# Patient Record
(demographics unavailable — no encounter records)

---

## 2018-01-01 NOTE — RAD
HISTORY:

Tachypnea in 35 weeker NB.  



COMPARISON:

No prior.



TECHNIQUE:

Chest PA and lateral



FINDINGS:



LUNGS:

No active pulmonary disease.



PLEURA:

No significant pleural effusion identified. No pneumothorax apparent.



CARDIOVASCULAR:

Normal.



OSSEOUS STRUCTURES:

No significant abnormalities.



VISUALIZED UPPER ABDOMEN:

Normal.



OTHER FINDINGS:

None.



IMPRESSION:

No active disease.

## 2018-01-01 NOTE — DELATT
===========================

Datetime: 2018 11:33

===========================

   

Del Note Departure Status:   Nursery

Del Note Status:  Late  male NB (35 weeker) by NVD.

   Mother is GBS positive with ROM about 22 HRS or more (seen in office yesterday PTA and membranes w
ere ruptured).  Had 6 doses of Ampicillin PTD.

   Also, the mother had one dose of Betamethasone yesterday.

   Mother has GDM that was diet controlled.

   Baby has mild intermittent tachynpnea after brief PPV/ressuscitation.

   Baby transferred to nursery for observtion.

Del Note Interventions Oth:  Called by DR. Fuentes for delivery attendance.

      

   Baby born with decreased activity.

   Stimulated. after stimulation has breathing effort but HR stayed 80/min.  PPV initiated; Applied f
or about 40 seconds.  Had excellent respond.

      

   APGAR:

   minute 1= 7 (-1 heart, -1 tone, -1 color).

   Minute 5: 9 (-1 for color).

      

   After PPV O2 applied by mask and with guidance of oximeter.

      

   Baby developed mild intermittent tachypnea without retractions.

      

      

Del Note Interventions:  Assessment; Stimulation; Drying; Blow By Oxygen; Bag/Mask

Del Note Reason for Attending:  Prematurity

UMER/NICU Del Atten Note Adm DT:  2018 11:42

## 2018-01-01 NOTE — NBADN
===========================

Datetime: 2018 11:42

===========================

   

Nsy Prov Gen Appearance:  Within Normal Limits

Nsy Prov Gen Appearance:  Within Normal Limits

Nsy Prov Skin:  Within Normal Limits

Nsy Prov Neuro:  Normal Tone; [place]; Grasp; Root; Suck

Nsy Prov Musculoskeletal:  Within Normal Limits; Full Range of Motion; Spontaneous Movement All Extre
mities; Intact Clavicles; Clavicles without Crepitus; Gluteal Folds Symmetrical; Spine Within Normal 
Limits

Nsy Prov Head:  Normal Fontanelles; Normocephalic; Sutures WNL

Nsy Prov EENT:  Mouth Within Normal Limits; Ears Within Normal Limits; Eyes Within Normal Limits; Nos
e Within Normal Limits; Face Within Normal Limits

Nsy Prov Cardiovascular:  Murmur

Nsy Prov Respiratory:  Tachypneic

Nsy Prov GI:  Within Normal Limits; Soft; Normal Liver; Non Palpable Spleen; Patent Anus

Nsy Prov Umbilicus:  Within Normal Limits

Nsy Prov :  Normal Male Genitalia

Nsy Prov Cardiovascular Details:  2/6 systolic soft murmur best heard over LLSB.

Nsy Prov Gen Appearance Details:  except for mild intermittent tachypnea.

Nsy Prov Respiratory Details:  RR it its upper limites reached 60-70/min for brief periods.

Nsy Prov Impression/Plan Details:  Late  male NB (35 weeker) by NVD.

   Mother is GBS positive with ROM about 22 HRS or more (seen in office yesterday PTA when membranes 
were ruptured).  Had 6 doses of Ampicillin PTD.

   Also, the mother had one dose of Betamethasone yesterday.

   Mother has GDM that was diet controlled.

   Baby has mild intermittent tachynpnea after brief PPV/ressuscitation.

   Baby has also heart murmur.

   Baby transferred to nursery for observtion.

      

   Plan: Nursery.  Possible mother-baby unit care if stability assured.

Nsy Prov Laboratory:  CBC. BCX. Accucheck. CXR. Echo.

   

===========================

Datetime: 2018 11:33

===========================

   

Mother's Rule Inc Maternal Age:  Age >=35 at [place] not specified

Mother's Rule Thalassemia:  Thalassemia History not specified

Mother's Rule Neural Tube Defect:  Neural Tube Defect History not specified

Mother's Rule Congenital Heart:  Congenital Heart Defect not specified

Mother's Rule Down Syndrome:  Down Syndrome History not specified

Mother's Rule Ed-Sachs:  Ed-Sachs History not specified

Mother's Rule [place]:  [place] History not specified

Mother's Rule Familial Dysauto:  Familial Dysautonomia History not specified

Mother's Rule Sickle Cell:  Sickle Cell Disease/Trait History not specified

Mother's Rule Hemophilia:  Hemophilia/Blood Disorder History not specified

Mother's Rule Muscular Dystrophy:  Muscular Dystrophy History not specified

Mother's Rule Cystic Fibrosis:  Cystic Fibrosis History not specified

Mother's Rule [place] Chor:  [place] Ch[place] History not specified

Mother's Rule Mental Retardation:  Mental Retardation/Autism History not specified

Mother's Rule Fragile X:  Fragile X Testing History not specified

Mother's Rule Oth Inherited DO:  Other Inherited/Chromosomal Disorders not specified

Mother's Rule Maternal Metabolic:  Maternal Metabolic History not specified

Mother's Rule FOB Birth Defects:  Pt Father or FOB Birth Defect History not specified

Mother's Rule Hx Stillborn MBL:  Loss/Stillborn History not specified

Mother's Rule Other Genetic Hx:  Other Genetic History not specified

Mother's Rule Drugs/Medications:  Drugs/Medications History not specified

Mother's Rule Gonorrhea:   Gonorrhea History Not Specified

Mother's Rule Chlamydia:  Chlamydia History not specified

Mother's Rule Syphilis:  Syphilis History not specified

Mother's Rule HIV/AIDS Exp:  HIV/Aids Exposure not specified

Mother's Rule HPV:  Human Papillomavirus History not specified

Mother's Rule Genital Herpes:  Genital Herpes not specified

Mother's Rule TB:  Tuberculosis History not specified

Mother's Rule Hepatitis:  Hepatitis History Not Specified

Mother's Rule Rash or Viral Ill:  Rash or Viral Illness History not specified

Mother's Rule Diabetes:  Diabetes History not specified

Mother's Rule Hypertension MBL:  History of Hypertension Not Specified

Mother's Rule Heart Disease:  Heart Disease History not specified

Mother's Rule Autoimmune:  Autoimmune Disorder History not specified

Mother's Rule Kidney Disease:  History of Kidney Disease/UTI not specified

Mother's Rule Neurologic:  Neurologic/Epilepsy Disorders not specified

Mother's Rule Psych Disorders:  Psychiatric Disorder History not specified

Mother's Rule Depression/PP Dep:  Depression/Postpartum Depression History not specified

Mother's Rule Hepaitis/tLiver:  History of Hepatitis/Liver Disease not specified

Mother's Rule Varicos/Phlebitis:  Varicosities/Phlebitis History Not Specified

Mother's Rule Thyroid Dysfunct:  Thyroid Dysfunction not specified

Mother's Rule Trauma/Violence:  Trauma/Violence History Not Specified

Mother's Rule Blood Transfusion:  Blood Transfusion History not specified

Mother's Rule Sensitization:  D (Rh) Sensitization not specified

Mother's Rule Pulmonary:  Pulmonary (Asthma, TB) History not specified

Mother's Rule Breast:  Breast History not specified

Mother's Rule Gyn Surgery:  Gyn Surgery Hx not specified

Mother's Rule Hosp/Surgery:  Hospitalization/Surgery History not specified

Mother's Rule Anesthetic Comp:  Anesthetic Complications Hx not specified

Mother's Rule Abnormal Pap:  Abnormal Pap Smear not specified

Mother's Rule Uterine Anomaly:  Uterine Anomaly/JOHAN not specified

Mother's Rule Infertility:  Infertility Not Specified

Mother's Rule ART Treatment:  ART Treatment History not specified

Mother's Rule Other Med Disease:  Other Medical Diseases History not specified

Mother's Rule Family History:  Significant Family History not specified

   

===========================

Datetime: 2018 08:40

===========================

   

Admit From NB:  Labor and Delivery Room



Weight Admission (gms), NB:  2715

Weight Admission (lbs), NB:  6

Weight Admission (oz) NB:  0

Length Admission (in), NB:  19.29

Head Circumference Adm (cm), NB:  32.50

Head circumference Adm (in), NB:  12.80

Chest Circumference Adm (cm), NB:  31.00

Abdominal Circumference Adm (cm):  30.00

Length Admission (cm), NB:  49.00

   

===========================

Datetime: 2018 14:33

===========================

   

Mother's PT-AGE:  40

Mother's :  5

Mother's Para:  3

Mother's :  0

Mother's Abortions Induced:  0

Mother's Abortions Sponteneous:  1

Mother's Living:  3

Mother's Primary Language MBL:  English

Mother's Blood Type:  O Positive

Mother's Group B Beta Strep:  Positive (Annotations: 2018)

Mother's Hepatitis B:  Negative (Annotations: 17)

Mother's Gonorrhea:  Negative

Mothers Chlamydia MBL:  Negative

Mother's Rubella:  Immune

Mother's Term:  3

Mother's RPR/VDRL:  Nonreactive (Annotations: 2017)

Mother's Marital Status:  SINGLE

## 2018-01-01 NOTE — NBDCN
===========================

Datetime: 2018 11:02

===========================

   

Nsy Prov Gen Appearance:  Notable

Nsy Prov Skin:  Within Normal Limits; Jaundice

Nsy Prov Neuro:  Normal Tone; [place]; Grasp; Root; Suck

Nsy Prov Musculoskeletal:  Within Normal Limits; Full Range of Motion; Spontaneous Movement All Extre
mities; Intact Clavicles; Clavicles without Crepitus; Gluteal Folds Symmetrical; Spine Within Normal 
Limits; No Sacral Dimple/Cyst

Nsy Prov Head:  Normal Fontanelles; Normocephalic; Sutures WNL

Nsy Prov EENT:  Mouth Within Normal Limits; Ears Within Normal Limits; Eyes Within Normal Limits; Eye
s Red Reflex Bilaterally; Nose Within Normal Limits; Face Within Normal Limits

Nsy Prov Cardiovascular:  Within Normal Limits; Normal Pulses; Murmur

Nsy Prov Respiratory:  Within Normal Limits

Nsy Prov GI:  Within Normal Limits; Soft; Normal Liver; Non Palpable Spleen; Patent Anus

Nsy Prov Umbilicus:  Within Normal Limits; Three Vessel Cord

Nsy Prov :  Normal Male Genitalia

Nsy Prov Cardiovascular Details:  faint grade 2/6 systolic murmur over Left sternal border

Nsy Prov Discharge:  Discharge Home Today; Vital Signs Appropriate; Bonding Appropriately; Voiding an
d Stooling; Appropriate Weight Loss; Follow Bilirubin Values

Nsy Prov Disch Comments:   +35 wks, NVD.

   1- Heart murmur: PDA. Plan: F/U with cardio. and repeat ECHO in 6 wks.

   2- Jaundice: F/u in 2 days.

   Plan of care discussed with mom.

Follow up in Weeks NB:  2 days

Disch Follow Up With:  MD Penny

Follow up Appt with NB:  Office

   

===========================

Datetime: 2018 07:45

===========================

   

Length cms, NB:  48.50

Length in, NB:  19.09

Head Circumference (cm), NB:  33.00

[place] Screenin2018 07:45

Congenital Heart Screen:  Negative, Congenital Heart Screen Complete (Annotations: Rescreened.)

   

===========================

Datetime: 2018 06:00

===========================

   

Formula Type:  [place]

   

===========================

Datetime: 2018 20:35

===========================

   

Hepatitis B Vaccine NB:  2018 00:00

   

===========================

Datetime: 2018 07:30

===========================

   

Hearing Screen Result, NB:  Right Ear Pass; Left Ear Pass

Hearing Screen Status:  Hearing Screen Complete

   

===========================

Datetime: 2018 21:00

===========================

   

Blood Type:  O Positive

Lab, Direct Srav[place]:  Negative

   

===========================

Datetime: 2018 15:23

===========================

   

Infant Birthdate and Time:  2018 08:15

Infant Sex - 1:  Male

Gestational Age at Deliv:  35+5

Method of Delivery:  Vaginal

Vacuum Extraction:  N/A

Forceps:  N/A

Mother's Steroids Given:  Partial Course

Apgar Score 1, NB:  7

Apgar Score5, NB:  9

Apgar Score10, NB:  10

Maternal Amniotic Fluid Color:  Clear

Mother's Blood Type:  O POS

Mother's Hepatitis B:  Negative (Annotations: 17)

Mother's Gonorrhea:  Negative

Mother's Chlamydia:  Negative

Mother's RPR/VDRL:  Nonreactive (Annotations: 2017)

Mother's Hx Herpes:  No

Mother's Rubella:  Immune

Mother's Group Beta Strep:  Positive (Annotations: 2018)

Mother's Antibiotics # of Doses:  6

Admission Birthweight, NB:  2715

Infant Weight (lb) MBL:  6

Infant Weight (oz) MBL:  0

Maternal Feeding Preference:  Both

   

===========================

Datetime: 2018 11:42

===========================

   

Nsy Prov Gen Appearance Details:  except for mild intermittent tachypnea.

Nsy Prov Respiratory Details:  RR it its upper limites reached 60-70/min for brief periods.

   

===========================

Datetime: 2018 11:33

===========================

   

Discharge Weight gms NB:  2625

Discharge Weight lbs NB:  5

Discharge Weight oz NB:  13

   

===========================

Datetime: 2018 08:40

===========================

   

Chest Circumference, NB:  31.00

## 2018-01-01 NOTE — NBPN
===========================

Datetime: 2018 07:47

===========================

   

Nsy Prov Gen Appearance:  Within Normal Limits

Nsy Prov Skin:  Within Normal Limits

Nsy Prov Neuro:  Normal Tone; [place]; Grasp; Root; Suck

Nsy Prov Musculoskeletal:  Within Normal Limits; Full Range of Motion; Spontaneous Movement All Extre
mities; Intact Clavicles; Clavicles without Crepitus; Gluteal Folds Symmetrical; Spine Within Normal 
Limits; No Sacral Dimple/Cyst

Nsy Prov Head:  Normal Fontanelles; Normocephalic; Sutures WNL

Nsy Prov EENT:  Mouth Within Normal Limits; Ears Within Normal Limits; Eyes Within Normal Limits; Eye
s Red Reflex Bilaterally; Nose Within Normal Limits; Face Within Normal Limits

Nsy Prov Cardiovascular:  Within Normal Limits; Normal Pulses

Nsy Prov Respiratory:  Within Normal Limits

Nsy Prov GI:  Within Normal Limits; Soft; Normal Liver; Non Palpable Spleen; Patent Anus

Nsy Prov Umbilicus:  Within Normal Limits; Three Vessel Cord

Nsy Prov :  Normal Male Genitalia

Nsy Prov Impression:  Healthy Term [place]; Vital Signs Appropriate; Bonding Appropriately; Voiding a
nd Stooling

Nsy Prov Plan:  Continue  Care

Nsy Prov Impression/Plan Details:  Well baby boy.

   

===========================

Datetime: 2018 11:42

===========================

   

Nsy Prov Gen Appearance Details:  except for mild intermittent tachypnea.

Nsy Prov Cardiovascular Details:  2/6 systolic soft murmur best heard over LLSB.

Nsy Prov Respiratory Details:  RR it its upper limites reached 60-70/min for brief periods.

Nsy Prov Laboratory:  CBC. BCX. Accucheck. CXR. Echo.

## 2018-07-30 NOTE — CARD
--------------- APPROVED REPORT --------------





EXAM: Two-dimensional and M-mode echocardiogram with Doppler and 

color Doppler.



INDICATION

Murmur 

ASD 

Murmur



Situs/Connections

(S,D,S). The apex directed leftward. 



A right superior vena cava drains normally to the right atrium. 

Inferior vena cava not assessed on this study.



Right atrium is borderline dilated.  There is stretched patent 

foramen ovale vs small to moderate secundum atrial septal defect with 

left to right flow.  





The tricuspid valve is normal. There is no tricuspid stenosis. There 

is mild to moderate tricuspid valve regurgitation. There is Doppler 

evidence of elevated right ventricular pressures. TR gradient is 45 

mmHg. 



The right ventricle is mildly dilated and Normal RV qualitative 

function. There is normal right ventricular wall thickness. No right 

ventricular outflow tract obstruction. 



The pulmonic valve is normal. There is no pulmonic valvular stenosis. 

There is trace pulmonary regurgitation. 



Pulmonary artery and proximal branch pulmonary arteries appear normal 

in size. There is moderate to large patent ductus arteriosus with 

bidirectional shunting. Flow is right to left in systole and left to 

right in diastole suggestive of systemic pulmonary artery pressure. 



At least two pulmonary veins seen returning to the left atrium. 



Left atrial size is normal.



The mitral valve leaflets appear normal. There is no evidence of 

fluttering, or prolapse. There is no mitral valve stenosis. There is 

no mitral valve regurgitation noted.  



The left ventricle is normal in size. The left ventricle is D shaped 

in systole suggestive for systolic RV pressure.  There is normal left 

ventricular wall thickness. 

Left ventricular systolic function is normal. 



No left ventricular outflow tract obstruction. 



Interventricular septum appears grossly intact. No large ventricular 

septal defect. 



The aortic valve appears trileaflet. There is no aortic valve 

regurgitation.  No aortic valve stenosis. 



Normal ascending, transverse and descending aorta. No coarctation of 

the aorta.    



Coronary arteries were not assessed on this study. 

There is no pericardial effusion.



<Conclusion>

Moderate to large patent ductus arteriosus.

Systemic pulmonary artery pressure. 

Stretched PFO vs small secundum ASD.

Mild to moderate tricuspid valve regurgitation.

Mildly dilated right ventricle. 

Normal LV systolic function. 33